# Patient Record
Sex: FEMALE | Race: WHITE
[De-identification: names, ages, dates, MRNs, and addresses within clinical notes are randomized per-mention and may not be internally consistent; named-entity substitution may affect disease eponyms.]

---

## 2018-05-16 ENCOUNTER — HOSPITAL ENCOUNTER (EMERGENCY)
Dept: HOSPITAL 26 - MED | Age: 53
Discharge: HOME | End: 2018-05-16
Payer: COMMERCIAL

## 2018-05-16 VITALS — DIASTOLIC BLOOD PRESSURE: 57 MMHG | SYSTOLIC BLOOD PRESSURE: 116 MMHG

## 2018-05-16 VITALS — HEIGHT: 59 IN | WEIGHT: 136.38 LBS | BODY MASS INDEX: 27.49 KG/M2

## 2018-05-16 DIAGNOSIS — D64.9: Primary | ICD-10-CM

## 2018-05-16 DIAGNOSIS — R53.1: ICD-10-CM

## 2018-05-16 DIAGNOSIS — N39.0: ICD-10-CM

## 2018-05-16 LAB
ALBUMIN FLD-MCNC: 3.8 G/DL (ref 3.4–5)
ANION GAP SERPL CALCULATED.3IONS-SCNC: 12 MMOL/L (ref 8–16)
AST SERPL-CCNC: 20 U/L (ref 15–37)
BASOPHILS # BLD AUTO: 0.1 K/UL (ref 0–0.22)
BASOPHILS NFR BLD AUTO: 0.5 % (ref 0–2)
BILIRUB SERPL-MCNC: 0.4 MG/DL (ref 0–1)
BUN SERPL-MCNC: 11 MG/DL (ref 7–18)
CHLORIDE SERPL-SCNC: 99 MMOL/L (ref 98–107)
CO2 SERPL-SCNC: 28.8 MMOL/L (ref 21–32)
CREAT SERPL-MCNC: 0.6 MG/DL (ref 0.6–1.3)
EOSINOPHIL # BLD AUTO: 0.1 K/UL (ref 0–0.4)
EOSINOPHIL NFR BLD AUTO: 0.7 % (ref 0–4)
ERYTHROCYTE [DISTWIDTH] IN BLOOD BY AUTOMATED COUNT: 20.1 % (ref 11.6–13.7)
GFR SERPL CREATININE-BSD FRML MDRD: 136 ML/MIN (ref 90–?)
GLUCOSE SERPL-MCNC: 105 MG/DL (ref 74–106)
HCT VFR BLD AUTO: 32 % (ref 36–48)
HGB BLD-MCNC: 9.6 G/DL (ref 12–16)
LYMPHOCYTES # BLD AUTO: 0.9 K/UL (ref 2.5–16.5)
LYMPHOCYTES NFR BLD AUTO: 8.3 % (ref 20.5–51.1)
MCH RBC QN AUTO: 19 PG (ref 27–31)
MCHC RBC AUTO-ENTMCNC: 30 G/DL (ref 33–37)
MCV RBC AUTO: 62.9 FL (ref 80–94)
MONOCYTES # BLD AUTO: 1.2 K/UL (ref 0.8–1)
MONOCYTES NFR BLD AUTO: 11.2 % (ref 1.7–9.3)
NEUTROPHILS # BLD AUTO: 8.2 K/UL (ref 1.8–7.7)
NEUTROPHILS NFR BLD AUTO: 79.3 % (ref 42.2–75.2)
PLATELET # BLD AUTO: 313 K/UL (ref 140–450)
POTASSIUM SERPL-SCNC: 3.8 MMOL/L (ref 3.5–5.1)
RBC # BLD AUTO: 5.09 MIL/UL (ref 4.2–5.4)
SODIUM SERPL-SCNC: 136 MMOL/L (ref 136–145)
WBC # BLD AUTO: 10.3 K/UL (ref 4.8–10.8)

## 2018-05-16 NOTE — NUR
PATIENT PRESENTS TO ED FOR COMPLAINTS OF RIB PAIN AND NEEDING A DOCTORS NOTE TO 
RETURN TO WORK FROM PREVIOUS VISIT YESTERDAY FOR SYNCOPE. PATIENT DENIES TRAUMA 
TO AREA. DENIES N/V. REPORTS DIARRHEA STARTING THIS MORNING. SKIN IS 
PINK/WARM/DRY; AAOX4 WITH EVEN AND STEADY GAIT; LUNGS CLEAR BL; HR EVEN AND 
REGULAR; PT DENIES ANY FEVER, CP, SOB, OR COUGH AT THIS TIME; PATIENT STATES 
PAIN OF 8/10 AT THIS TIME; VSS; PATIENT POSITIONED FOR COMFORT; HOB ELEVATED; 
BEDRAILS UP X1; BED DOWN. ER MD MADE AWARE OF PT STATUS.

## 2021-08-27 NOTE — NUR
Patient discharged with v/s stable. Written and verbal after care instructions 
given and explained. 

Patient verbalized understanding. Ambulatory with steady gait. All questions 
addressed prior to discharge. Advised to follow up with PMD. no fever/no chills

## 2022-10-15 ENCOUNTER — HOSPITAL ENCOUNTER (EMERGENCY)
Dept: HOSPITAL 26 - MED | Age: 57
LOS: 1 days | Discharge: HOME | End: 2022-10-16
Payer: COMMERCIAL

## 2022-10-15 VITALS — BODY MASS INDEX: 29.23 KG/M2 | HEIGHT: 59 IN | WEIGHT: 145 LBS

## 2022-10-15 VITALS — SYSTOLIC BLOOD PRESSURE: 160 MMHG | DIASTOLIC BLOOD PRESSURE: 95 MMHG

## 2022-10-15 DIAGNOSIS — M10.9: Primary | ICD-10-CM

## 2022-10-15 PROCEDURE — 96372 THER/PROPH/DIAG INJ SC/IM: CPT

## 2022-10-15 PROCEDURE — 99284 EMERGENCY DEPT VISIT MOD MDM: CPT

## 2022-10-16 VITALS — DIASTOLIC BLOOD PRESSURE: 95 MMHG | SYSTOLIC BLOOD PRESSURE: 160 MMHG

## 2022-10-16 NOTE — NUR
Patient discharged with v/s stable. Written and verbal after care instructions 
given and explained. 

Patient alert, oriented and verbalized understanding of instructions. 
Ambulatory with steady gait. All questions addressed prior to discharge. ID 
band removed. Patient advised to follow up with PMD. Rx of PREDNISONE given.